# Patient Record
Sex: FEMALE | Race: OTHER | Employment: FULL TIME | URBAN - METROPOLITAN AREA
[De-identification: names, ages, dates, MRNs, and addresses within clinical notes are randomized per-mention and may not be internally consistent; named-entity substitution may affect disease eponyms.]

---

## 2024-10-04 ENCOUNTER — HOSPITAL ENCOUNTER (EMERGENCY)
Age: 29
Discharge: HOME | End: 2024-10-04

## 2024-10-04 ENCOUNTER — APPOINTMENT (OUTPATIENT)
Dept: CT IMAGING | Age: 29
End: 2024-10-04

## 2024-10-04 VITALS
RESPIRATION RATE: 16 BRPM | OXYGEN SATURATION: 99 % | HEART RATE: 60 BPM | SYSTOLIC BLOOD PRESSURE: 121 MMHG | DIASTOLIC BLOOD PRESSURE: 76 MMHG | TEMPERATURE: 98.1 F | BODY MASS INDEX: 29.02 KG/M2 | HEIGHT: 64 IN | WEIGHT: 170 LBS

## 2024-10-04 DIAGNOSIS — R51.9 NONINTRACTABLE HEADACHE, UNSPECIFIED CHRONICITY PATTERN, UNSPECIFIED HEADACHE TYPE: Primary | ICD-10-CM

## 2024-10-04 DIAGNOSIS — L03.811 CELLULITIS OF HEAD EXCEPT FACE: ICD-10-CM

## 2024-10-04 LAB
GONADOTROPIN, CHORIONIC (HCG) QUANT: <0.1 MIU/ML
POC CREATININE WHOLE BLOOD: 0.7

## 2024-10-04 PROCEDURE — 6360000004 HC RX CONTRAST MEDICATION

## 2024-10-04 PROCEDURE — 6370000000 HC RX 637 (ALT 250 FOR IP)

## 2024-10-04 PROCEDURE — 99285 EMERGENCY DEPT VISIT HI MDM: CPT

## 2024-10-04 PROCEDURE — 84702 CHORIONIC GONADOTROPIN TEST: CPT

## 2024-10-04 PROCEDURE — 6360000002 HC RX W HCPCS

## 2024-10-04 PROCEDURE — 96374 THER/PROPH/DIAG INJ IV PUSH: CPT

## 2024-10-04 PROCEDURE — 70470 CT HEAD/BRAIN W/O & W/DYE: CPT

## 2024-10-04 RX ORDER — CEPHALEXIN 500 MG/1
500 CAPSULE ORAL ONCE
Status: COMPLETED | OUTPATIENT
Start: 2024-10-04 | End: 2024-10-04

## 2024-10-04 RX ORDER — IOPAMIDOL 755 MG/ML
75 INJECTION, SOLUTION INTRAVASCULAR
Status: COMPLETED | OUTPATIENT
Start: 2024-10-04 | End: 2024-10-04

## 2024-10-04 RX ORDER — KETOROLAC TROMETHAMINE 15 MG/ML
15 INJECTION, SOLUTION INTRAMUSCULAR; INTRAVENOUS ONCE
Status: COMPLETED | OUTPATIENT
Start: 2024-10-04 | End: 2024-10-04

## 2024-10-04 RX ORDER — CEPHALEXIN 500 MG/1
500 CAPSULE ORAL 2 TIMES DAILY
Qty: 14 CAPSULE | Refills: 0 | Status: SHIPPED | OUTPATIENT
Start: 2024-10-04 | End: 2024-10-11

## 2024-10-04 RX ADMIN — CEPHALEXIN 500 MG: 500 CAPSULE ORAL at 19:42

## 2024-10-04 RX ADMIN — IOPAMIDOL 75 ML: 755 INJECTION, SOLUTION INTRAVENOUS at 18:17

## 2024-10-04 RX ADMIN — KETOROLAC TROMETHAMINE 15 MG: 15 INJECTION, SOLUTION INTRAMUSCULAR; INTRAVENOUS at 19:42

## 2024-10-04 ASSESSMENT — PAIN DESCRIPTION - LOCATION: LOCATION: HEAD

## 2024-10-04 ASSESSMENT — LIFESTYLE VARIABLES
HOW MANY STANDARD DRINKS CONTAINING ALCOHOL DO YOU HAVE ON A TYPICAL DAY: 1 OR 2
HOW OFTEN DO YOU HAVE A DRINK CONTAINING ALCOHOL: MONTHLY OR LESS

## 2024-10-04 ASSESSMENT — PAIN DESCRIPTION - PAIN TYPE: TYPE: ACUTE PAIN

## 2024-10-04 ASSESSMENT — PAIN SCALES - GENERAL: PAINLEVEL_OUTOF10: 8

## 2024-10-04 ASSESSMENT — PAIN DESCRIPTION - DESCRIPTORS: DESCRIPTORS: ACHING

## 2024-10-04 ASSESSMENT — PAIN - FUNCTIONAL ASSESSMENT: PAIN_FUNCTIONAL_ASSESSMENT: 0-10

## 2024-10-04 NOTE — ED TRIAGE NOTES
Patient arrived to ER by private vehicle.   Patient c/o \"lump on head\".   Patient denies any injury to head.

## 2024-10-05 LAB
PERFORMED ON: NORMAL
POC CREATININE: 0.7 MG/DL (ref 0.6–1.2)
POC SAMPLE TYPE: NORMAL

## 2024-10-05 NOTE — ED NOTES
Patient discharged at this time. Educated on discharge instructions and follow-up with recommended provider and/or specialist. Patient verbalizes understanding and denies questions/needs. Patient ambulates with steady gait at time of discharge in no noted distress. Patient's skin pink, warm and dry, respirations easy and non-labored. Prescribed medications discussed with patient and denies questions/needs.

## 2024-10-05 NOTE — ED PROVIDER NOTES
8:17 PM EDT  [facility] ED  EMERGENCY DEPARTMENT ENCOUNTER      Pt Name: Tali Edwards  MRN: [id number]  Birthdate 1995  Date of evaluation: 10/4/2024  Provider: Josefina Hoffman MD    CHIEF COMPLAINT       Chief Complaint   Patient presents with    Headache     C/o lump on back of head         HISTORY OF PRESENT ILLNESS   (Location/Symptom, Timing/Onset, Context/Setting, Quality, Duration, Modifying Factors, Severity)  Note limiting factors.       Tali Edwards is a 29 y.o. female who presents to the emergency department for chief complaint of head.  Patient states that she has noticed a lump develop over the last few days.  Lump is located at the back of her head.  Patient states the lump is painful, especially when she is sleeping.  Patient denies any head injury.  Patient denies being on blood thinners.  Patient denies any other symptoms at this time.  Patient denies similar episodes in the past.  The history is provided by the patient, a relative and medical records.       Nursing Notes were reviewed.    REVIEW OF SYSTEMS    (2-9 systems for level 4, 10 or more for level 5)     Review of Systems    Except as noted above the remainder of the review of systems was reviewed and negative.       PAST MEDICAL HISTORY   History reviewed. No pertinent past medical history.      SURGICAL HISTORY     History reviewed. No pertinent surgical history.      CURRENT MEDICATIONS       Previous Medications    No medications on file       ALLERGIES     Patient has no known allergies.    FAMILY HISTORY     History reviewed. No pertinent family history.       SOCIAL HISTORY       Social History     Socioeconomic History    Marital status: Single     Spouse name: None    Number of children: None    Years of education: None    Highest education level: None   Tobacco Use    Smoking status: Never    Smokeless tobacco: Never   Vaping Use    Vaping status: Every Day   Substance and Sexual Activity    Alcohol use: Yes      the last few days.  Patient endorses that the swelling is painful.  Patient denies any fevers, chills, weight loss, systemic signs, recent injuries, being on blood thinners.  Physical exam demonstrates a 2 cm localized swelling present over her occiput, no fluctuance noted.  Erythema noted over the swelling.  CT of the head with and without contrast demonstrates no acute abnormalities.  Due to the erythema present, we will treat this as cellulitis, patient has been asked to return to the emergency department, if her symptoms worsen or persist.  All questions answered, and patient has been discharged on cephalexin  Patient asked to return to the emergency department if their symptoms worsen. Patient verbalized that they are agreeable and understandable to the plan. The results of pertinent diagnostic studies and exam findings were discussed with the patient. The patient's provisional diagnosis was discussed. The risks of medications administered and prescribed were discussed. Symptomatic management was discussed.  Anticipatory guidance was given. Explicit return precautions were discussed. Educational materials were provided. Plan of care was discussed. The patient was provided with written instructions and appropriate follow-up information.     Procedures        CRITICAL CARE TIME   Total Critical Care time was 0 minutes, excluding separately reportable procedures.  There was a high probability of clinically significant/life threatening deterioration in the patient's condition which required my urgent intervention.       FINAL IMPRESSION      1. Nonintractable headache, unspecified chronicity pattern, unspecified headache type    2. Cellulitis of head except face          DISPOSITION/PLAN   DISPOSITION Discharge - Pending Orders Complete 10/04/2024 07:29:00 PM      PATIENT REFERRED TO:  [facility] ED  [address] [id number] [phone]    As needed, If symptoms worsen    Follow up with